# Patient Record
Sex: FEMALE | Race: WHITE
[De-identification: names, ages, dates, MRNs, and addresses within clinical notes are randomized per-mention and may not be internally consistent; named-entity substitution may affect disease eponyms.]

---

## 2024-03-13 LAB — HBA1C MFR BLD HPLC: 5.9 %

## 2024-04-16 ENCOUNTER — HOSPITAL ENCOUNTER (OUTPATIENT)
Dept: HOSPITAL 99 - HWRAD | Age: 69
End: 2024-04-16
Payer: COMMERCIAL

## 2024-04-16 DIAGNOSIS — R22.9: Primary | ICD-10-CM

## 2024-05-01 ENCOUNTER — EVALUATION (OUTPATIENT)
Dept: PHYSICAL THERAPY | Facility: CLINIC | Age: 69
End: 2024-05-01
Payer: COMMERCIAL

## 2024-05-01 DIAGNOSIS — S92.325D CLOSED NONDISPLACED FRACTURE OF SECOND METATARSAL BONE OF LEFT FOOT WITH ROUTINE HEALING, SUBSEQUENT ENCOUNTER: Primary | ICD-10-CM

## 2024-05-01 PROCEDURE — 97161 PT EVAL LOW COMPLEX 20 MIN: CPT | Performed by: PHYSICAL THERAPIST

## 2024-05-01 PROCEDURE — 97110 THERAPEUTIC EXERCISES: CPT | Performed by: PHYSICAL THERAPIST

## 2024-05-01 NOTE — LETTER
May 1, 2024    Seth Lopes PA-C  [address] [facility] [address]    Patient: Adrienne Beebe   YOB: 1955   Date of Visit: 2024     Encounter Diagnosis     ICD-10-CM    1. Closed nondisplaced fracture of second metatarsal bone of left foot with routine healing, subsequent encounter  S92.325D           Dear Dr. Lopes:    Thank you for your recent referral of Adrienne Beebe. Please review the attached evaluation summary from Adrienne's recent visit.     Please verify that you agree with the plan of care by signing the attached order.     If you have any questions or concerns, please do not hesitate to call.     I sincerely appreciate the opportunity to share in the care of one of your patients and hope to have another opportunity to work with you in the near future.       Sincerely,    Anil Ray, PT      Referring Provider:      I certify that I have read the below Plan of Care and certify the need for these services furnished under this plan of treatment while under my care.                    Seth Lopes PA-C  [address] [facility] [address]  Via Fax: [phone]          PT Evaluation     Today's date: 2024  Patient name: Adrienne Beebe  : 1955  MRN: [id number]  Referring provider: Seth Lopes PA-C  Dx:   Encounter Diagnosis     ICD-10-CM    1. Closed nondisplaced fracture of second metatarsal bone of left foot with routine healing, subsequent encounter  S92.325D                      Assessment  Assessment details: Alin is a 68 year old female 6 months after non-discplaced fracture of her 2nd metatarsal who is recovering well.  She reports only mild pain with high level and extended functional mobility tasks.  She presents with some extremely mild ankle stiffness, some decreased ankle strength, some decreased balance and motor control.  These impairments are contributing to her mild functional mobility restrictions  "and contributing to her greatest concern of her 2nd toe being slightly extended.    Impairments: abnormal coordination, abnormal or restricted ROM, lacks appropriate home exercise program and pain with function    Symptom irritability: low  Goals  Short Term Goals (2 weeks)  Pt will demonstrate at least MCID improvement in FOTO.   Pt will present with 50% improvement in ankle strength    Long Term Goals (8-12 weeks)  Pt will independently manage symptoms successfully.  Pt will present with 5/5 strength throughout the LE to decrease difficulty with all functional mobility tasks.  Pt will exceed expected outcome in FOTO.  Pt will be independent with comprehensive HEP.      Plan  Planned therapy interventions: joint mobilization, muscle pump exercises, neuromuscular re-education, therapeutic activities, strengthening, therapeutic exercise, graded exercise and home exercise program  Frequency: 1x week  Duration in visits: 4  Duration in weeks: 8  Treatment plan discussed with: patient    Subjective Evaluation    History of Present Illness  Mechanism of injury: , fall while putting her sandal on.  Falling off 2 slate steps flat onto her chest, foot plantarflexed, slaping the edge of the step.  Driven to hospital, diagnosis of closed non-displaced fracture of L 2nd metatarsal.  4 weeks in walking boot.  Transitioned to a surgical shoe for \"a few weeks.\" Mid-January was able to walk on the beach without any shoes.  Her primary concern currently is \"my toe wings up\" (2nd toe extension).  She reports fluctuating levels of pain that \"moves around.\" Being active and on her feet often tends to aggravate the foot.  She reports the pain \"moves around\" her foot and ankle.  Her greatest concern is the \"toe winging up.\"   Patient Goals  Patient goals for therapy: decreased pain  Patient goal: \"get the foot straight\"  Pain  Current pain ratin  At worst pain ratin  Location: Around the whole left " foot        Posture/Observation:  Standing   WNL    Palpation:   Mild pain end of 2nd met     A/PROM:   Left   Right  Ankle DF   WNL   WNL   Ankle PF   WNL   WNL   Ankle inversion  WNL   WNL   Ankle eversion  WNL   WNL    Strength:   Left   Right    Ankle DF   5/5   5/5   Ankle PF   4/5   4/5   Ankle inversion  4/5   4/5   Ankle eversion  4/5   4/5       Special Tests:    NT    Function:   Single Leg Stance - 10-15 seconds L / 8-12 seconds R  Ambulation - WNL            Precautions: None      Manuals 5/1            Toe flexion PROM                                                    Neuro Re-Ed             SLS 30 sec             SLS Leg Swings 10x ea.                                                                             Ther Ex             Toe Flexion PROM 10 sec            Toe flexion Stretch (standing or sitting) 10x10 sec                         Isometric Toe Flexion 10x, 3 sec             Toe Flexion with Band 10x, 3 sec                                                   Ther Activity                                       Gait Training                                       Modalities

## 2024-05-01 NOTE — PROGRESS NOTES
PT Evaluation     Today's date: 2024  Patient name: Adrienne Beebe  : 1955  MRN: [id number]  Referring provider: Seth Lopes PA-C  Dx:   Encounter Diagnosis     ICD-10-CM    1. Closed nondisplaced fracture of second metatarsal bone of left foot with routine healing, subsequent encounter  S92.325D                      Assessment  Assessment details: Alin is a 68 year old female 6 months after non-discplaced fracture of her 2nd metatarsal who is recovering well.  She reports only mild pain with high level and extended functional mobility tasks.  She presents with some extremely mild ankle stiffness, some decreased ankle strength, some decreased balance and motor control.  These impairments are contributing to her mild functional mobility restrictions and contributing to her greatest concern of her 2nd toe being slightly extended.    Impairments: abnormal coordination, abnormal or restricted ROM, lacks appropriate home exercise program and pain with function    Symptom irritability: low  Goals  Short Term Goals (2 weeks)  Pt will demonstrate at least MCID improvement in FOTO.   Pt will present with 50% improvement in ankle strength    Long Term Goals (8-12 weeks)  Pt will independently manage symptoms successfully.  Pt will present with 5/5 strength throughout the LE to decrease difficulty with all functional mobility tasks.  Pt will exceed expected outcome in FOTO.  Pt will be independent with comprehensive HEP.      Plan  Planned therapy interventions: joint mobilization, muscle pump exercises, neuromuscular re-education, therapeutic activities, strengthening, therapeutic exercise, graded exercise and home exercise program  Frequency: 1x week  Duration in visits: 4  Duration in weeks: 8  Treatment plan discussed with: patient    Subjective Evaluation    History of Present Illness  Mechanism of injury: , fall while putting her sandal on.  Falling off 2 slate steps flat onto her  "chest, foot plantarflexed, slaping the edge of the step.  Driven to hospital, diagnosis of closed non-displaced fracture of L 2nd metatarsal.  4 weeks in walking boot.  Transitioned to a surgical shoe for \"a few weeks.\" Mid-January was able to walk on the beach without any shoes.  Her primary concern currently is \"my toe wings up\" (2nd toe extension).  She reports fluctuating levels of pain that \"moves around.\" Being active and on her feet often tends to aggravate the foot.  She reports the pain \"moves around\" her foot and ankle.  Her greatest concern is the \"toe winging up.\"   Patient Goals  Patient goals for therapy: decreased pain  Patient goal: \"get the foot straight\"  Pain  Current pain ratin  At worst pain ratin  Location: Around the whole left foot        Posture/Observation:  Standing   WNL    Palpation:   Mild pain end of 2nd met     A/PROM:   Left   Right  Ankle DF   WNL   WNL   Ankle PF   WNL   WNL   Ankle inversion  WNL   WNL   Ankle eversion  WNL   WNL    Strength:   Left   Right    Ankle DF   5/5   5/5   Ankle PF   4/5   4/5   Ankle inversion  4/5   4/5   Ankle eversion  4/5   4/5       Special Tests:    NT    Function:   Single Leg Stance - 10-15 seconds L / 8-12 seconds R  Ambulation - WNL            Precautions: None      Manuals 5/1            Toe flexion PROM                                                    Neuro Re-Ed             SLS 30 sec             SLS Leg Swings 10x ea.                                                                             Ther Ex             Toe Flexion PROM 10 sec            Toe flexion Stretch (standing or sitting) 10x10 sec                         Isometric Toe Flexion 10x, 3 sec             Toe Flexion with Band 10x, 3 sec                                                   Ther Activity                                       Gait Training                                       Modalities                                            "

## 2024-05-24 ENCOUNTER — OFFICE VISIT (OUTPATIENT)
Dept: PHYSICAL THERAPY | Facility: CLINIC | Age: 69
End: 2024-05-24
Payer: COMMERCIAL

## 2024-05-24 DIAGNOSIS — S92.325D CLOSED NONDISPLACED FRACTURE OF SECOND METATARSAL BONE OF LEFT FOOT WITH ROUTINE HEALING, SUBSEQUENT ENCOUNTER: Primary | ICD-10-CM

## 2024-05-24 PROCEDURE — 97110 THERAPEUTIC EXERCISES: CPT | Performed by: PHYSICAL THERAPIST

## 2024-05-24 NOTE — PROGRESS NOTES
Daily Note     Today's date: 2024  Patient name: Adrienne Beebe  : 1955  MRN: [id number]  Referring provider: Seth Lopes PA-C  Dx:   Encounter Diagnosis     ICD-10-CM    1. Closed nondisplaced fracture of second metatarsal bone of left foot with routine healing, subsequent encounter  S92.325D                      Subjective: Adrienne feels she is making some progress with the position of her toe.       Objective: See treatment diary below      Assessment: Tolerated treatment well. Is happy with current exercise program and doesn't feel the exercises need to be progressed at this time.       Plan: Continue per plan of care.      Precautions: None      Manuals            Toe flexion PROM                                                    Neuro Re-Ed             SLS 30 sec  30 sec            SLS Leg Swings 10x ea. 10x ea.                                                                            Ther Ex             Toe Flexion PROM 10 sec 10 sec           Toe flexion Stretch (standing or sitting) 10x10 sec 10x10 sec                        Isometric Toe Flexion 10x, 3 sec  10x 3 sec           Toe Flexion with Band (just 2nd) 10x, 3 sec 10x 3 sec                                                  Ther Activity                                       Gait Training                                       Modalities

## 2024-06-13 ENCOUNTER — EVALUATION (OUTPATIENT)
Dept: PHYSICAL THERAPY | Facility: CLINIC | Age: 69
End: 2024-06-13
Payer: COMMERCIAL

## 2024-06-13 DIAGNOSIS — S92.325D CLOSED NONDISPLACED FRACTURE OF SECOND METATARSAL BONE OF LEFT FOOT WITH ROUTINE HEALING, SUBSEQUENT ENCOUNTER: Primary | ICD-10-CM

## 2024-06-13 PROCEDURE — 97112 NEUROMUSCULAR REEDUCATION: CPT | Performed by: PHYSICAL THERAPIST

## 2024-06-13 PROCEDURE — 97110 THERAPEUTIC EXERCISES: CPT | Performed by: PHYSICAL THERAPIST

## 2024-06-13 NOTE — PROGRESS NOTES
Discharge    Today's date: 2024  Patient name: Adrienne Beebe  : 1955  MRN: [id number]  Referring provider: Seth Lopes PA-C  Dx:   Encounter Diagnosis     ICD-10-CM    1. Closed nondisplaced fracture of second metatarsal bone of left foot with routine healing, subsequent encounter  S92.325D                        Assessment  Impairments: abnormal coordination, abnormal or restricted ROM, lacks appropriate home exercise program and pain with function  Symptom irritability: low    Assessment details: Progress :  Adrienne is making obvious and consistent improvement with foot pain, activity tolerance with current exercise program.  She has demonstrated obvious improvements in strength, balance and motor control which has helped to facilitate this.  Secondary to independence with currently prescribed program, she is appropriate for discharge today.     Alin is a 68 year old female 6 months after non-discplaced fracture of her 2nd metatarsal who is recovering well.  She reports only mild pain with high level and extended functional mobility tasks.  She presents with some extremely mild ankle stiffness, some decreased ankle strength, some decreased balance and motor control.  These impairments are contributing to her mild functional mobility restrictions and contributing to her greatest concern of her 2nd toe being slightly extended.      Goals  Short Term Goals (2 weeks)  Pt will demonstrate at least MCID improvement in FOTO. MET  Pt will present with 50% improvement in ankle strengthMET    Long Term Goals (8-12 weeks)  Pt will independently manage symptoms successfully.MET  Pt will present with 5/5 strength throughout the LE to decrease difficulty with all functional mobility tasks.MET  Pt will exceed expected outcome in FOTO.MET  Pt will be independent with comprehensive HEP.      Plan    Planned therapy interventions: joint mobilization, muscle pump exercises, neuromuscular  "re-education, therapeutic activities, strengthening, therapeutic exercise, graded exercise and home exercise program    Frequency: 1x week  Duration in weeks: 8  Treatment plan discussed with: patient    Subjective Evaluation    History of Present Illness  Mechanism of injury: , fall while putting her sandal on.  Falling off 2 slate steps flat onto her chest, foot plantarflexed, slaping the edge of the step.  Driven to hospital, diagnosis of closed non-displaced fracture of L 2nd metatarsal.  4 weeks in walking boot.  Transitioned to a surgical shoe for \"a few weeks.\" Mid-January was able to walk on the beach without any shoes.  Her primary concern currently is \"my toe wings up\" (2nd toe extension).  She reports fluctuating levels of pain that \"moves around.\" Being active and on her feet often tends to aggravate the foot.  She reports the pain \"moves around\" her foot and ankle.  Her greatest concern is the \"toe winging up.\"   Patient Goals  Patient goals for therapy: decreased pain  Patient goal: \"get the foot straight\"  Pain  Current pain ratin  At worst pain ratin  Location: Around the whole left foot        Posture/Observation:  Standing   WNL    Palpation:   Mild pain end of 2nd met -> Improved, eliminated pain    A/PROM:   Left   Right  Ankle DF   WNL   WNL   Ankle PF   WNL   WNL   Ankle inversion  WNL   WNL   Ankle eversion  WNL   WNL    Strength:   Left   Right    Ankle DF   5/5   5/5   Ankle PF   5/5   5/5   Ankle inversion  4/5   4/5   Ankle eversion  4/5   4/5       Special Tests:    NT    Function:   Single Leg Stance - 30+ seconds L / 30+ seconds R  Ambulation - WNL            Precautions: None        Manuals                  Toe flexion PROM                                                                                               Neuro Re-Ed                       SLS 30 sec  30 sec   Review                 SLS Leg Swings 10x ea. 10x ea.  Review                        "                                                                                                                  Ther Ex                       Toe Flexion PROM 10 sec 10 sec  Review                 Toe flexion Stretch (standing or sitting) 10x10 sec 10x10 sec  Review                                         Isometric Toe Flexion 10x, 3 sec  10x 3 sec  Review                 Toe Flexion with Band (just 2nd) 10x, 3 sec 10x 3 sec  Review                                                                                         Ther Activity                                                                       Gait Training                                                                       Modalities

## 2024-06-27 ENCOUNTER — APPOINTMENT (OUTPATIENT)
Dept: PHYSICAL THERAPY | Facility: CLINIC | Age: 69
End: 2024-06-27
Payer: COMMERCIAL

## 2024-08-22 ENCOUNTER — OFFICE VISIT (OUTPATIENT)
Dept: FAMILY MEDICINE CLINIC | Facility: CLINIC | Age: 69
End: 2024-08-22
Payer: COMMERCIAL

## 2024-08-22 VITALS
HEART RATE: 70 BPM | WEIGHT: 117 LBS | BODY MASS INDEX: 18.8 KG/M2 | TEMPERATURE: 97.4 F | OXYGEN SATURATION: 98 % | HEIGHT: 66 IN | SYSTOLIC BLOOD PRESSURE: 126 MMHG | DIASTOLIC BLOOD PRESSURE: 64 MMHG

## 2024-08-22 DIAGNOSIS — K40.90 UNILATERAL INGUINAL HERNIA WITHOUT OBSTRUCTION OR GANGRENE, RECURRENCE NOT SPECIFIED: ICD-10-CM

## 2024-08-22 DIAGNOSIS — D17.22 LIPOMA OF LEFT UPPER EXTREMITY: Primary | ICD-10-CM

## 2024-08-22 DIAGNOSIS — F41.9 ANXIETY: ICD-10-CM

## 2024-08-22 DIAGNOSIS — R11.0 NAUSEA: ICD-10-CM

## 2024-08-22 PROCEDURE — 99204 OFFICE O/P NEW MOD 45 MIN: CPT

## 2024-08-22 RX ORDER — LISINOPRIL 10 MG/1
10 TABLET ORAL DAILY
COMMUNITY

## 2024-08-22 RX ORDER — DIPHENOXYLATE HYDROCHLORIDE AND ATROPINE SULFATE 2.5; .025 MG/1; MG/1
1 TABLET ORAL DAILY
COMMUNITY

## 2024-08-22 NOTE — PROGRESS NOTES
"Ambulatory Visit  Name: Adrienne Beebe      : 1955      MRN: [id number]  Encounter Provider: Valerie Hernandez DO  Encounter Date: 2024   Encounter department: [facility]    Assessment & Plan   1. Lipoma of left upper extremity  Assessment & Plan:  -per patient, mass was ultrasounded at [facility] a few months ago and was confirmed to be a lipoma, will need to acquire records     Plan:  Referral to general surgery given today  Orders:  -     Ambulatory Referral to General Surgery; Future  2. Nausea  Assessment & Plan:  -in the morning, resolves after coffee, vaping medical marijuana    Plan:  Encourage adequate hydration   Will check CBC, CMP, TSH   Offered prn zofran, pt declines   Return for follow up, MAW visit in 2-3 months   Orders:  -     Comprehensive metabolic panel; Future  -     TSH, 3rd generation with Free T4 reflex; Future  -     CBC and Platelet; Future  -     Comprehensive metabolic panel  -     TSH, 3rd generation with Free T4 reflex  -     CBC and Platelet  3. Unilateral inguinal hernia without obstruction or gangrene, recurrence not specified  Assessment & Plan:  -present for 2 years, nontender, does not appear strangulated.    Plan:  Referral to general surgery given  Orders:  -     Ambulatory Referral to General Surgery; Future  4. Anxiety  -     TSH, 3rd generation with Free T4 reflex; Future  -     TSH, 3rd generation with Free T4 reflex       History of Present Illness     Pt presents to establish care. States she likes to \"keep it minimal\" with her healthcare and is not interested in preventative screenings like mammograms, DEXA, or colonoscopy    Has a lipoma on her left shoulder. Has had it for ~10 years but states it has grown in size and is very uncomfortable and is pulling on her neck. Had an U/S of it this spring at [facility] and was told it is a lipoma. Wants to have it removed but wants to switch from [facility] to [facility].    States she wakes up " "with nausea almost every morning. States coffee helps it go away. Usually resolves on its own in half an hour but can linger.     Also has a left inguinal hernia present for 2 years. Not painful or red.    States she has a history of anxiety for which she vapes medical marijuana, which also helps with nausea.        Review of Systems   Constitutional:  Negative for chills and fever.   HENT:  Negative for ear pain and sore throat.    Eyes:  Negative for pain and visual disturbance.   Respiratory:  Negative for cough and shortness of breath.    Cardiovascular:  Negative for chest pain and palpitations.   Gastrointestinal:  Positive for nausea. Negative for abdominal pain and vomiting.   Genitourinary:  Negative for dysuria and hematuria.   Musculoskeletal:  Negative for arthralgias and back pain.   Skin:  Negative for color change and rash.   Neurological:  Negative for seizures and syncope.   All other systems reviewed and are negative.      Objective     /64 (BP Location: Left arm, Patient Position: Sitting, Cuff Size: Adult)   Pulse 70   Temp (!) 97.4 °F (36.3 °C) (Tympanic)   Ht 5' 6\" (1.676 m)   Wt 53.1 kg (117 lb)   SpO2 98%   BMI 18.88 kg/m²     Physical Exam  Constitutional:       General: She is not in acute distress.     Appearance: Normal appearance. She is not ill-appearing or toxic-appearing.   HENT:      Head: Normocephalic and atraumatic.      Right Ear: External ear normal.      Left Ear: External ear normal.      Nose: Nose normal.   Eyes:      Conjunctiva/sclera: Conjunctivae normal.   Cardiovascular:      Rate and Rhythm: Normal rate and regular rhythm.      Heart sounds: Normal heart sounds. No murmur heard.  Pulmonary:      Effort: Pulmonary effort is normal. No respiratory distress.      Breath sounds: Normal breath sounds. No wheezing, rhonchi or rales.   Abdominal:      Hernia: A hernia is present. Hernia is present in the left inguinal area.   Musculoskeletal:         General: " Normal range of motion.   Skin:     General: Skin is warm and dry.          Neurological:      General: No focal deficit present.      Mental Status: She is alert and oriented to person, place, and time.   Psychiatric:         Mood and Affect: Mood normal.         Behavior: Behavior normal.       Administrative Statements

## 2024-08-22 NOTE — ASSESSMENT & PLAN NOTE
-present for 2 years, nontender, does not appear strangulated.    Plan:  Referral to general surgery given

## 2024-08-22 NOTE — ASSESSMENT & PLAN NOTE
-per patient, mass was ultrasounded at [place] a few months ago and was confirmed to be a lipoma, will need to acquire records     Plan:  Referral to general surgery given today

## 2024-08-22 NOTE — ASSESSMENT & PLAN NOTE
-in the morning, resolves after coffee, vaping medical marijuana    Plan:  Encourage adequate hydration   Will check CBC, CMP, TSH   Offered prn zofran, pt declines   Return for follow up, MAW visit in 2-3 months

## 2024-09-11 ENCOUNTER — CONSULT (OUTPATIENT)
Dept: SURGERY | Facility: CLINIC | Age: 69
End: 2024-09-11
Payer: COMMERCIAL

## 2024-09-11 VITALS
DIASTOLIC BLOOD PRESSURE: 88 MMHG | BODY MASS INDEX: 19.13 KG/M2 | WEIGHT: 119 LBS | RESPIRATION RATE: 16 BRPM | OXYGEN SATURATION: 99 % | TEMPERATURE: 97.3 F | HEART RATE: 91 BPM | HEIGHT: 66 IN | SYSTOLIC BLOOD PRESSURE: 138 MMHG

## 2024-09-11 DIAGNOSIS — E46 PROTEIN CALORIE MALNUTRITION (HCC): Primary | ICD-10-CM

## 2024-09-11 DIAGNOSIS — D17.22 LIPOMA OF LEFT UPPER EXTREMITY: ICD-10-CM

## 2024-09-11 DIAGNOSIS — K40.90 UNILATERAL INGUINAL HERNIA WITHOUT OBSTRUCTION OR GANGRENE, RECURRENCE NOT SPECIFIED: ICD-10-CM

## 2024-09-11 PROCEDURE — 99202 OFFICE O/P NEW SF 15 MIN: CPT | Performed by: SURGERY

## 2024-09-11 RX ORDER — SODIUM CHLORIDE, SODIUM LACTATE, POTASSIUM CHLORIDE, CALCIUM CHLORIDE 600; 310; 30; 20 MG/100ML; MG/100ML; MG/100ML; MG/100ML
125 INJECTION, SOLUTION INTRAVENOUS CONTINUOUS
OUTPATIENT
Start: 2024-10-07

## 2024-09-11 NOTE — H&P (VIEW-ONLY)
Ambulatory Visit  Name: Adrienne Beebe      : 1955      MRN: [id number]  Encounter Provider: Ayo Luna MD  Encounter Date: 2024   Encounter department: [facility]    Assessment & Plan  Unilateral inguinal hernia without obstruction or gangrene, recurrence not specified  She states that she believes she has a hernia but she is not interested in repair with mesh.  I explained that I fixed all hernias with mesh and I do not perform an open tension type hernia repair.  I did explain that in women's the recommendation is a laparoscopic approach because the high risk of a femoral hernia as well as a contralateral inguinal hernia.  She is not interested in pursuing this at this time.  Orders:    Ambulatory Referral to General Surgery    Lipoma of left upper extremity  She has a 70s mass on her left upper shoulder.  I reviewed her ultrasound from [place] is about 3 cm.  This may be slightly larger now.  Will plan for excision with sedation at [facility].  The risks benefits alters explained to her she is agreeable to proceed.  Orders:    Ambulatory Referral to General Surgery    Case request operating room: EXCISION BIOPSY TISSUE LESION/MASS LEFT SHOULDER; Standing    Case request operating room: EXCISION BIOPSY TISSUE LESION/MASS LEFT SHOULDER    Protein calorie malnutrition (HCC)  Malnutrition Findings:                                 BMI Findings:           Body mass index is 19.21 kg/m².              History of Present Illness     Adrienne Beebe is a 68 y.o. female who presents for evaluation of primarily a subcutaneous mass to her left upper shoulder.  She also has an inguinal hernia.  The lump on her shoulder been present for several years but she feels is getting larger.  She is some discomfort on her left shoulder from the lump at times.  History obtained from : patient  Review of Systems   Constitutional:  Negative for appetite change,  chills and fever.   HENT:  Negative for congestion and ear pain.    Eyes:  Negative for discharge and itching.   Respiratory:  Negative for chest tightness and shortness of breath.    Cardiovascular:  Negative for chest pain and palpitations.   Gastrointestinal:  Negative for abdominal distention and abdominal pain.   Skin:  Negative for color change and rash.   Neurological:  Negative for dizziness and numbness.   Psychiatric/Behavioral:  Negative for agitation and confusion.      Past Medical History   Past Medical History:   Diagnosis Date    Hypertension      Past Surgical History:   Procedure Laterality Date    TUBAL LIGATION      WISDOM TOOTH EXTRACTION       Family History   Problem Relation Age of Onset    Stroke Mother     Heart disease Father      Current Outpatient Medications on File Prior to Visit   Medication Sig Dispense Refill    lisinopril (ZESTRIL) 10 mg tablet Take 10 mg by mouth daily      multivitamin (THERAGRAN) TABS Take 1 tablet by mouth daily      Omega-3 Fatty Acids (FISH OIL PO) Take by mouth       No current facility-administered medications on file prior to visit.     Allergies   Allergen Reactions    Sulfa Antibiotics Rash      Current Outpatient Medications on File Prior to Visit   Medication Sig Dispense Refill    lisinopril (ZESTRIL) 10 mg tablet Take 10 mg by mouth daily      multivitamin (THERAGRAN) TABS Take 1 tablet by mouth daily      Omega-3 Fatty Acids (FISH OIL PO) Take by mouth       No current facility-administered medications on file prior to visit.      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   Constitutional:       General: She is not in acute distress.     Appearance: She is well-developed. She is not diaphoretic.   HENT:      Head: Normocephalic and atraumatic.   Eyes:      Pupils: Pupils are equal, round, and reactive to light.   Cardiovascular:      Rate and Rhythm: Normal rate and regular rhythm.   Pulmonary:      Effort: Pulmonary effort is normal. No respiratory distress.   Abdominal:      Palpations: Abdomen is soft.   Musculoskeletal:         General: Normal range of motion.      Cervical back: Normal range of motion and neck supple.   Skin:     General: Skin is warm and dry.      Comments: Left shoulder supraclavicular area there is a about 3 to 4 cm smooth mobile subcutaneous mass sitting on top of the trapezius.   Neurological:      Mental Status: She is alert and oriented to person, place, and time.   Psychiatric:         Behavior: Behavior normal.         "

## 2024-09-11 NOTE — PROGRESS NOTES
Ambulatory Visit  Name: Adrienne Beebe      : 1955      MRN: [id number]  Encounter Provider: Ayo Luna MD  Encounter Date: 2024   Encounter department: [facility]    Assessment & Plan  Unilateral inguinal hernia without obstruction or gangrene, recurrence not specified  She states that she believes she has a hernia but she is not interested in repair with mesh.  I explained that I fixed all hernias with mesh and I do not perform an open tension type hernia repair.  I did explain that in women's the recommendation is a laparoscopic approach because the high risk of a femoral hernia as well as a contralateral inguinal hernia.  She is not interested in pursuing this at this time.  Orders:    Ambulatory Referral to General Surgery    Lipoma of left upper extremity  She has a 70s mass on her left upper shoulder.  I reviewed her ultrasound from [place] is about 3 cm.  This may be slightly larger now.  Will plan for excision with sedation at [facility].  The risks benefits alters explained to her she is agreeable to proceed.  Orders:    Ambulatory Referral to General Surgery    Case request operating room: EXCISION BIOPSY TISSUE LESION/MASS LEFT SHOULDER; Standing    Case request operating room: EXCISION BIOPSY TISSUE LESION/MASS LEFT SHOULDER    Protein calorie malnutrition (HCC)  Malnutrition Findings:                                 BMI Findings:           Body mass index is 19.21 kg/m².              History of Present Illness     Adrienne Beebe is a 68 y.o. female who presents for evaluation of primarily a subcutaneous mass to her left upper shoulder.  She also has an inguinal hernia.  The lump on her shoulder been present for several years but she feels is getting larger.  She is some discomfort on her left shoulder from the lump at times.  History obtained from : patient  Review of Systems   Constitutional:  Negative for appetite change,  chills and fever.   HENT:  Negative for congestion and ear pain.    Eyes:  Negative for discharge and itching.   Respiratory:  Negative for chest tightness and shortness of breath.    Cardiovascular:  Negative for chest pain and palpitations.   Gastrointestinal:  Negative for abdominal distention and abdominal pain.   Skin:  Negative for color change and rash.   Neurological:  Negative for dizziness and numbness.   Psychiatric/Behavioral:  Negative for agitation and confusion.      Past Medical History   Past Medical History:   Diagnosis Date    Hypertension      Past Surgical History:   Procedure Laterality Date    TUBAL LIGATION      WISDOM TOOTH EXTRACTION       Family History   Problem Relation Age of Onset    Stroke Mother     Heart disease Father      Current Outpatient Medications on File Prior to Visit   Medication Sig Dispense Refill    lisinopril (ZESTRIL) 10 mg tablet Take 10 mg by mouth daily      multivitamin (THERAGRAN) TABS Take 1 tablet by mouth daily      Omega-3 Fatty Acids (FISH OIL PO) Take by mouth       No current facility-administered medications on file prior to visit.     Allergies   Allergen Reactions    Sulfa Antibiotics Rash      Current Outpatient Medications on File Prior to Visit   Medication Sig Dispense Refill    lisinopril (ZESTRIL) 10 mg tablet Take 10 mg by mouth daily      multivitamin (THERAGRAN) TABS Take 1 tablet by mouth daily      Omega-3 Fatty Acids (FISH OIL PO) Take by mouth       No current facility-administered medications on file prior to visit.      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   Constitutional:       General: She is not in acute distress.     Appearance: She is well-developed. She is not diaphoretic.   HENT:      Head: Normocephalic and atraumatic.   Eyes:      Pupils: Pupils are equal, round, and reactive to light.   Cardiovascular:      Rate and Rhythm: Normal rate and regular rhythm.   Pulmonary:      Effort: Pulmonary effort is normal. No respiratory distress.   Abdominal:      Palpations: Abdomen is soft.   Musculoskeletal:         General: Normal range of motion.      Cervical back: Normal range of motion and neck supple.   Skin:     General: Skin is warm and dry.      Comments: Left shoulder supraclavicular area there is a about 3 to 4 cm smooth mobile subcutaneous mass sitting on top of the trapezius.   Neurological:      Mental Status: She is alert and oriented to person, place, and time.   Psychiatric:         Behavior: Behavior normal.         "

## 2024-09-11 NOTE — ASSESSMENT & PLAN NOTE
She states that she believes she has a hernia but she is not interested in repair with mesh.  I explained that I fixed all hernias with mesh and I do not perform an open tension type hernia repair.  I did explain that in women's the recommendation is a laparoscopic approach because the high risk of a femoral hernia as well as a contralateral inguinal hernia.  She is not interested in pursuing this at this time.  Orders:    Ambulatory Referral to General Surgery

## 2024-09-11 NOTE — ASSESSMENT & PLAN NOTE
She has a 70s mass on her left upper shoulder.  I reviewed her ultrasound from [place] is about 3 cm.  This may be slightly larger now.  Will plan for excision with sedation at [facility]  The risks benefits alters explained to her she is agreeable to proceed.  Orders:    Ambulatory Referral to General Surgery    Case request operating room: EXCISION BIOPSY TISSUE LESION/MASS LEFT SHOULDER; Standing    Case request operating room: EXCISION BIOPSY TISSUE LESION/MASS LEFT SHOULDER

## 2024-09-11 NOTE — ASSESSMENT & PLAN NOTE
Malnutrition Findings:                                 BMI Findings:           Body mass index is 19.21 kg/m².

## 2024-09-26 LAB
ALBUMIN SERPL-MCNC: 4.5 G/DL (ref 3.9–4.9)
ALP SERPL-CCNC: 77 IU/L (ref 44–121)
ALT SERPL-CCNC: 23 IU/L (ref 0–32)
AST SERPL-CCNC: 34 IU/L (ref 0–40)
BILIRUB SERPL-MCNC: 0.3 MG/DL (ref 0–1.2)
BUN SERPL-MCNC: 19 MG/DL (ref 8–27)
BUN/CREAT SERPL: 23 (ref 12–28)
CALCIUM SERPL-MCNC: 9.9 MG/DL (ref 8.7–10.3)
CHLORIDE SERPL-SCNC: 102 MMOL/L (ref 96–106)
CO2 SERPL-SCNC: 23 MMOL/L (ref 20–29)
CREAT SERPL-MCNC: 0.84 MG/DL (ref 0.57–1)
EGFR: 75 ML/MIN/1.73
ERYTHROCYTE [DISTWIDTH] IN BLOOD BY AUTOMATED COUNT: 12 % (ref 11.7–15.4)
GLOBULIN SER-MCNC: 2.6 G/DL (ref 1.5–4.5)
GLUCOSE SERPL-MCNC: 108 MG/DL (ref 70–99)
HCT VFR BLD AUTO: 44.4 % (ref 34–46.6)
HGB BLD-MCNC: 14.7 G/DL (ref 11.1–15.9)
MCH RBC QN AUTO: 30.7 PG (ref 26.6–33)
MCHC RBC AUTO-ENTMCNC: 33.1 G/DL (ref 31.5–35.7)
MCV RBC AUTO: 93 FL (ref 79–97)
PLATELET # BLD AUTO: 274 X10E3/UL (ref 150–450)
POTASSIUM SERPL-SCNC: 4.8 MMOL/L (ref 3.5–5.2)
PROT SERPL-MCNC: 7.1 G/DL (ref 6–8.5)
RBC # BLD AUTO: 4.79 X10E6/UL (ref 3.77–5.28)
SODIUM SERPL-SCNC: 140 MMOL/L (ref 134–144)
TSH SERPL DL<=0.005 MIU/L-ACNC: 1.44 UIU/ML (ref 0.45–4.5)
WBC # BLD AUTO: 7.8 X10E3/UL (ref 3.4–10.8)

## 2024-10-03 ENCOUNTER — ANESTHESIA EVENT (OUTPATIENT)
Dept: PERIOP | Facility: HOSPITAL | Age: 69
End: 2024-10-03
Payer: COMMERCIAL

## 2024-10-04 NOTE — PRE-PROCEDURE INSTRUCTIONS
Pre-Surgery Instructions:   Medication Instructions    lisinopril (ZESTRIL) 10 mg tablet Hold day of surgery.    MAGNESIUM PO Stop taking 3 days prior to surgery.    multivitamin (THERAGRAN) TABS Stop taking 3 days prior to surgery.    Omega-3 Fatty Acids (FISH OIL PO) Stopped 9/30   Medication instructions for day surgery reviewed. Please use only a sip of water to take your instructed medications. Avoid all over the counter vitamins, supplements and NSAIDS for one week prior to surgery per anesthesia guidelines. Tylenol is ok to take as needed.     You will receive a call one business day prior to surgery with an arrival time and hospital directions. If your surgery is scheduled on a Monday, the hospital will be calling you on the Friday prior to your surgery. If you have not heard from anyone by 8pm, please call the hospital supervisor through the hospital  at [phone]. ([place] [phone] or [place] [phone]).    Do not eat or drink anything after midnight the night before your surgery, including candy, mints, lifesavers, or chewing gum. Do not drink alcohol 24hrs before your surgery. Try not to smoke at least 24hrs before your surgery.       Follow the pre surgery showering instructions as listed in the “My Surgical Experience Booklet” or otherwise provided by your surgeon's office. Do not use a blade to shave the surgical area 1 week before surgery. It is okay to use a clean electric clippers up to 24 hours before surgery. Do not apply any lotions, creams, including makeup, cologne, deodorant, or perfumes after showering on the day of your surgery. Do not use dry shampoo, hair spray, hair gel, or any type of hair products.     No contact lenses, eye make-up, or artificial eyelashes. Remove nail polish, including gel polish, and any artificial, gel, or acrylic nails if possible. Remove all jewelry including rings and body piercing jewelry.     Wear causal clothing that is easy to take on and  off. Consider your type of surgery.    Keep any valuables, jewelry, piercings at home. Please bring any specially ordered equipment (sling, braces) if indicated.    Arrange for a responsible person to drive you to and from the hospital on the day of your surgery. Please confirm the visitor policy for the day of your procedure when you receive your phone call with an arrival time.     Call the surgeon's office with any new illnesses, exposures, or additional questions prior to surgery.    Please reference your “My Surgical Experience Booklet” for additional information to prepare for your upcoming surgery.

## 2024-10-07 ENCOUNTER — ANESTHESIA (OUTPATIENT)
Dept: PERIOP | Facility: HOSPITAL | Age: 69
End: 2024-10-07
Payer: COMMERCIAL

## 2024-10-07 ENCOUNTER — HOSPITAL ENCOUNTER (OUTPATIENT)
Facility: HOSPITAL | Age: 69
Setting detail: OUTPATIENT SURGERY
Discharge: HOME | End: 2024-10-07
Attending: SURGERY | Admitting: SURGERY
Payer: COMMERCIAL

## 2024-10-07 VITALS
TEMPERATURE: 98.2 F | WEIGHT: 116.84 LBS | DIASTOLIC BLOOD PRESSURE: 103 MMHG | HEIGHT: 66 IN | RESPIRATION RATE: 12 BRPM | BODY MASS INDEX: 18.78 KG/M2 | SYSTOLIC BLOOD PRESSURE: 181 MMHG | HEART RATE: 68 BPM | OXYGEN SATURATION: 98 %

## 2024-10-07 DIAGNOSIS — D17.22 LIPOMA OF LEFT UPPER EXTREMITY: ICD-10-CM

## 2024-10-07 PROBLEM — E78.5 HYPERLIPIDEMIA: Status: ACTIVE | Noted: 2024-10-07

## 2024-10-07 PROBLEM — I10 HTN (HYPERTENSION): Status: ACTIVE | Noted: 2024-10-07

## 2024-10-07 PROCEDURE — 12032 INTMD RPR S/A/T/EXT 2.6-7.5: CPT | Performed by: SURGERY

## 2024-10-07 PROCEDURE — 88304 TISSUE EXAM BY PATHOLOGIST: CPT | Performed by: SPECIALIST

## 2024-10-07 PROCEDURE — 11406 EXC TR-EXT B9+MARG >4.0 CM: CPT | Performed by: SURGERY

## 2024-10-07 RX ORDER — ONDANSETRON 2 MG/ML
INJECTION INTRAMUSCULAR; INTRAVENOUS AS NEEDED
Status: DISCONTINUED | OUTPATIENT
Start: 2024-10-07 | End: 2024-10-07

## 2024-10-07 RX ORDER — LIDOCAINE HYDROCHLORIDE AND EPINEPHRINE 10; 10 MG/ML; UG/ML
INJECTION, SOLUTION INFILTRATION; PERINEURAL AS NEEDED
Status: DISCONTINUED | OUTPATIENT
Start: 2024-10-07 | End: 2024-10-07 | Stop reason: HOSPADM

## 2024-10-07 RX ORDER — PROPOFOL 10 MG/ML
INJECTION, EMULSION INTRAVENOUS AS NEEDED
Status: DISCONTINUED | OUTPATIENT
Start: 2024-10-07 | End: 2024-10-07

## 2024-10-07 RX ORDER — HYDROCODONE BITARTRATE AND ACETAMINOPHEN 5; 325 MG/1; MG/1
1 TABLET ORAL EVERY 4 HOURS PRN
Status: DISCONTINUED | OUTPATIENT
Start: 2024-10-07 | End: 2024-10-07 | Stop reason: HOSPADM

## 2024-10-07 RX ORDER — SODIUM CHLORIDE, SODIUM LACTATE, POTASSIUM CHLORIDE, CALCIUM CHLORIDE 600; 310; 30; 20 MG/100ML; MG/100ML; MG/100ML; MG/100ML
125 INJECTION, SOLUTION INTRAVENOUS CONTINUOUS
Status: DISCONTINUED | OUTPATIENT
Start: 2024-10-07 | End: 2024-10-07 | Stop reason: HOSPADM

## 2024-10-07 RX ORDER — DEXAMETHASONE SODIUM PHOSPHATE 10 MG/ML
INJECTION, SOLUTION INTRAMUSCULAR; INTRAVENOUS AS NEEDED
Status: DISCONTINUED | OUTPATIENT
Start: 2024-10-07 | End: 2024-10-07

## 2024-10-07 RX ORDER — SODIUM CHLORIDE, SODIUM LACTATE, POTASSIUM CHLORIDE, CALCIUM CHLORIDE 600; 310; 30; 20 MG/100ML; MG/100ML; MG/100ML; MG/100ML
125 INJECTION, SOLUTION INTRAVENOUS CONTINUOUS
Status: DISCONTINUED | OUTPATIENT
Start: 2024-10-07 | End: 2024-10-07 | Stop reason: SDUPTHER

## 2024-10-07 RX ORDER — ONDANSETRON 2 MG/ML
4 INJECTION INTRAMUSCULAR; INTRAVENOUS ONCE AS NEEDED
Status: DISCONTINUED | OUTPATIENT
Start: 2024-10-07 | End: 2024-10-07 | Stop reason: HOSPADM

## 2024-10-07 RX ORDER — CEFAZOLIN SODIUM 1 G/50ML
1000 SOLUTION INTRAVENOUS ONCE
Status: COMPLETED | OUTPATIENT
Start: 2024-10-07 | End: 2024-10-07

## 2024-10-07 RX ORDER — HYDROCODONE BITARTRATE AND ACETAMINOPHEN 5; 325 MG/1; MG/1
1 TABLET ORAL EVERY 6 HOURS PRN
Qty: 6 TABLET | Refills: 0 | Status: SHIPPED | OUTPATIENT
Start: 2024-10-07 | End: 2024-10-17

## 2024-10-07 RX ORDER — FENTANYL CITRATE 50 UG/ML
INJECTION, SOLUTION INTRAMUSCULAR; INTRAVENOUS AS NEEDED
Status: DISCONTINUED | OUTPATIENT
Start: 2024-10-07 | End: 2024-10-07

## 2024-10-07 RX ORDER — LIDOCAINE HYDROCHLORIDE 20 MG/ML
INJECTION, SOLUTION EPIDURAL; INFILTRATION; INTRACAUDAL; PERINEURAL AS NEEDED
Status: DISCONTINUED | OUTPATIENT
Start: 2024-10-07 | End: 2024-10-07

## 2024-10-07 RX ORDER — HYDROMORPHONE HCL/PF 1 MG/ML
0.5 SYRINGE (ML) INJECTION
Status: DISCONTINUED | OUTPATIENT
Start: 2024-10-07 | End: 2024-10-07 | Stop reason: HOSPADM

## 2024-10-07 RX ORDER — FENTANYL CITRATE/PF 50 MCG/ML
25 SYRINGE (ML) INJECTION
Status: DISCONTINUED | OUTPATIENT
Start: 2024-10-07 | End: 2024-10-07 | Stop reason: HOSPADM

## 2024-10-07 RX ORDER — BUPIVACAINE HYDROCHLORIDE 5 MG/ML
INJECTION, SOLUTION EPIDURAL; INTRACAUDAL AS NEEDED
Status: DISCONTINUED | OUTPATIENT
Start: 2024-10-07 | End: 2024-10-07 | Stop reason: HOSPADM

## 2024-10-07 RX ORDER — MIDAZOLAM HYDROCHLORIDE 2 MG/2ML
INJECTION, SOLUTION INTRAMUSCULAR; INTRAVENOUS AS NEEDED
Status: DISCONTINUED | OUTPATIENT
Start: 2024-10-07 | End: 2024-10-07

## 2024-10-07 RX ADMIN — FENTANYL CITRATE 50 MCG: 50 INJECTION INTRAMUSCULAR; INTRAVENOUS at 09:34

## 2024-10-07 RX ADMIN — SODIUM CHLORIDE, SODIUM LACTATE, POTASSIUM CHLORIDE, AND CALCIUM CHLORIDE: .6; .31; .03; .02 INJECTION, SOLUTION INTRAVENOUS at 09:47

## 2024-10-07 RX ADMIN — PROPOFOL 150 MG: 10 INJECTION, EMULSION INTRAVENOUS at 09:30

## 2024-10-07 RX ADMIN — ONDANSETRON 4 MG: 2 INJECTION INTRAMUSCULAR; INTRAVENOUS at 09:40

## 2024-10-07 RX ADMIN — DEXAMETHASONE SODIUM PHOSPHATE 8 MG: 10 INJECTION INTRAMUSCULAR; INTRAVENOUS at 09:40

## 2024-10-07 RX ADMIN — CEFAZOLIN SODIUM 1000 MG: 1 SOLUTION INTRAVENOUS at 09:20

## 2024-10-07 RX ADMIN — FENTANYL CITRATE 50 MCG: 50 INJECTION INTRAMUSCULAR; INTRAVENOUS at 10:05

## 2024-10-07 RX ADMIN — LIDOCAINE HYDROCHLORIDE 60 MG: 20 INJECTION, SOLUTION EPIDURAL; INFILTRATION; INTRACAUDAL at 09:30

## 2024-10-07 RX ADMIN — MIDAZOLAM 2 MG: 1 INJECTION INTRAMUSCULAR; INTRAVENOUS at 09:23

## 2024-10-07 RX ADMIN — SODIUM CHLORIDE, SODIUM LACTATE, POTASSIUM CHLORIDE, AND CALCIUM CHLORIDE 125 ML/HR: .6; .31; .03; .02 INJECTION, SOLUTION INTRAVENOUS at 08:13

## 2024-10-07 NOTE — ANESTHESIA PREPROCEDURE EVALUATION
Procedure:  EXCISION BX TISSUE LESION/MASS LEFT SHOULDER (Left: Shoulder)    Relevant Problems   ANESTHESIA (within normal limits)      CARDIO   (+) HTN (hypertension)      ENDO (within normal limits)      GI/HEPATIC (within normal limits)      /RENAL (within normal limits)      HEMATOLOGY (within normal limits)      MUSCULOSKELETAL (within normal limits)      NEURO/PSYCH (within normal limits)  THC daily       PULMONARY (within normal limits)        Physical Exam    Airway    Mallampati score: I         Dental       Cardiovascular  Cardiovascular exam normal    Pulmonary  Pulmonary exam normal     Other Findings  post-pubertal.      Anesthesia Plan  ASA Score- 2     Anesthesia Type- general with ASA Monitors.         Additional Monitors:     Airway Plan:            Plan Factors-Exercise tolerance (METS): >4 METS.    Chart reviewed.   Existing labs reviewed. Patient summary reviewed.                  Induction- intravenous.    Postoperative Plan- Plan for postoperative opioid use.         Informed Consent- Anesthetic plan and risks discussed with patient.  I personally reviewed this patient with the CRNA. Discussed and agreed on the Anesthesia Plan with the CRNA..

## 2024-10-07 NOTE — INTERVAL H&P NOTE
H&P reviewed. After examining the patient I find no changes in the patients condition since the H&P had been written.    Vitals:    10/07/24 0802   BP: 160/83   Pulse: 72   Resp: 16   Temp: 98.4 °F (36.9 °C)   SpO2: 92%

## 2024-10-07 NOTE — DISCHARGE INSTR - AVS FIRST PAGE
[facility]     Post-Operative Care Instructions     Dr. Ayo Luna MD, [facility]     [phone]          1. General: You will feel pulling sensations around the wound or funny aches and pains around the incisions. This is normal. Even minor surgery is a change in your body and this is your body’s way of reacting to it. If you have had abdominal surgery, it may help to support the incision with a small pillow or blanket for comfort when moving or coughing.     2. Wound care:  Okay to shower.  The glue will fall off over the next week or 2.   Use ice for at least the 1st 48 hours.  Do not use for longer than 20 minutes at a time. Use 3 times per day.     3. Water: You may shower over the wounds. Do not bathe or use a pool or hot tub until cleared by the physician.   If you were discharged with a drain, make sure drain site is covered with plastic wrap before showering.      4. Activity: You may go up and down stairs, walk as much as you are comfortable, but walk at least 3 times each day. If you have had abdominal surgery, do not lift anything heavier than 20 pounds for at least 4 weeks.      5. Diet: You may resume a regular diet. If you had a same-day surgery or overnight stay surgery, you may wish to eat lightly for a few days: soups, crackers, and sandwiches. You may resume a regular diet when ready.      6. Medications: Resume all of your previous medications, unless told otherwise by the doctor. Avoid aspirin products for 2-3 days after the date of surgery. You may, at that time, began to take them again. Use Tylenol and Ibuprofen for pain control.  You may alternate these medications every 3 hours.  For example: you may take Tylenol at noon, Ibuprofen at 3:00 p.m., and Tylenol again at 6:00 p.m., etc. You should use ice to assist with pain control as above.  You do not need to take narcotic pain medication unless you are having significant pain.   If you were prescribed a  narcotic pain medication containing Tylenol, such as Percocet or Norco, do not use supplemental Tylenol.      7. Driving: You will need someone to drive you home on the day of surgery or discharge. Do not drive or make any important decisions while on narcotic pain medication or 24 hours and after anesthesia or sedation for surgery. Generally, you may drive when your off all narcotic pain medications and you are comfortable.      8. Upset Stomach: You may take Maalox, Tums, or similar items for an upset stomach. If your narcotic pain medication causes an upset stomach, do not take it on an empty stomach. Try taking it with at least some crackers or toast.      9. Constipation: Patients often experience constipation after surgery. You may take over-the-counter medication for this, such as Metamucil, Senokot, Dulcolax, milk of magnesia, etc. You may take a suppository unless you have had anorectal surgery such as a procedure on your hemorrhoids. If you experience significant nausea or vomiting after abdominal surgery, call the office before trying any of these medications.     10. Call the office: If you are experiencing any of the following: fevers above 101.5°, significant nausea or vomiting, if the wound develops drainage and/or there is excessive redness around the wound, or if you have significant diarrhea or other worsening symptoms.     11. Pain: You may be given a prescription for pain medication.  This will be sent to your pharmacy prior to discharge.     12. Sexual Activity: You may resume sexual activity when you feel ready and comfortable and your incision is sealed and healed without apparent infection risk.     13. Urination: If you have not urinated in 6 hours, go directly to the ER for evaluation for urinary retention.      14. Follow-up in 2 weeks.          **READ ONLY IF YOU HAVE BEEN DISCHARGED WITH A URINARY CATHETER**    Quesada Insertion for Post-Op Urinary Retention        - A prescription for  Flomax will be sent to your pharmacy.  This should be taken daily while the urinary catheter remains in place.    You will not be given a prescription for Flomax if your prostate has been removed.  If you are already taking Flomax, continue the medication as prescribed.     - We will send a message to the urology group who will contact you within the next 48 hours with further instructions and to schedule an appointment for voiding trial and catheter removal.  The urinary catheter will remain in place for approximately 1 week.  If you are not contacted within the next 48 hours please call our office to assist with scheduling your follow-up.     - If you have your own urologist, you should contact your physician the day after discharge for instructions and to schedule a voiding trial and catheter removal.

## 2024-10-07 NOTE — ANESTHESIA POSTPROCEDURE EVALUATION
Post-Op Assessment Note    CV Status:  Stable    Pain management: adequate       Mental Status:  Alert and awake   Hydration Status:  Euvolemic   PONV Controlled:  Controlled   Airway Patency:  Patent     Post Op Vitals Reviewed: Yes    No anethesia notable event occurred.    Staff: CRNA               /77 (10/07/24 1026)    Temp 97.9 °F (36.6 °C) (10/07/24 1026)    Pulse 67 (10/07/24 1026)   Resp   14   SpO2   100%

## 2024-10-07 NOTE — OP NOTE
OPERATIVE REPORT  PATIENT NAME: Adrienne Beebe    :  1955  MRN: [id number]  Pt Location: [facility] OR ROOM 03    SURGERY DATE: 10/7/2024    Surgeons and Role:     * Ayo Luna MD - Primary     * Tod Arce MD - Assisting    Preop Diagnosis:  Lipoma of left upper extremity [D17.22]    Post-Op Diagnosis Codes:     * Lipoma of left upper extremity [D17.22]    Procedure(s):  Left - EXCISION BX TISSUE LESION/MASS LEFT SHOULDER    Specimen(s):  ID Type Source Tests Collected by Time Destination   1 : left shoulder mass Tissue Arm, Left TISSUE EXAM Ayo Luna MD 10/7/2024 0956        Estimated Blood Loss:   Minimal    Drains:  * No LDAs found *    Anesthesia Type:   Choice    Operative Indications:  Lipoma of left upper extremity [D17.22]      Operative Findings:  Subcutaneous mass of the left shoulder measuring 5.5 cm x 2.5 cm.      Complications:   None    Procedure and Technique:  The patient was seen again in the Holding Room. The risks, benefits, complications, treatment options, and expected outcomes were discussed with the patient.  The patient and/or family concurred with the proposed plan, giving informed consent. The site of surgery properly noted/marked. The patient was taken to Operating Room, identified as Adrienne Beebe  and the procedure verified. A Time Out was held after prepping and draping in sterile fashion.  The above information was confirmed.    A mixture of 1% lidocaine with epinephrine and 0.5% Marcaine was used for local anesthesia.  An incision was made with a 15 blade scalpel.  The subcu tissue dissected with Bovie cautery down to the subtendinous mass.  The mass identified and freed up circumferentially by combination of blunt dissection and Bovie cautery.  The mass measured 5.5 x 2.5 cm.  He was sent to pathology.  The wound was inspected there was excellent hemostasis.  The skin was closed in layers with interrupted 3-0 Vicryl suture followed by a running 4-0 Monocryl  subcuticular suture.  Followed by glue.       I was present for the entire procedure.    Patient Disposition:  PACU              SIGNATURE: Ayo Luna MD  DATE: October 7, 2024  TIME: 10:03 AM

## 2024-10-11 PROCEDURE — 88304 TISSUE EXAM BY PATHOLOGIST: CPT | Performed by: SPECIALIST

## 2024-10-23 ENCOUNTER — OFFICE VISIT (OUTPATIENT)
Dept: SURGERY | Facility: CLINIC | Age: 69
End: 2024-10-23

## 2024-10-23 VITALS
HEART RATE: 59 BPM | SYSTOLIC BLOOD PRESSURE: 142 MMHG | RESPIRATION RATE: 16 BRPM | BODY MASS INDEX: 19.13 KG/M2 | TEMPERATURE: 96.8 F | DIASTOLIC BLOOD PRESSURE: 80 MMHG | HEIGHT: 66 IN | OXYGEN SATURATION: 95 % | WEIGHT: 119 LBS

## 2024-10-23 DIAGNOSIS — K40.90 UNILATERAL INGUINAL HERNIA WITHOUT OBSTRUCTION OR GANGRENE: Primary | ICD-10-CM

## 2024-10-23 DIAGNOSIS — D17.22 LIPOMA OF LEFT UPPER EXTREMITY: ICD-10-CM

## 2024-10-23 PROCEDURE — 99024 POSTOP FOLLOW-UP VISIT: CPT | Performed by: SURGERY

## 2024-10-23 NOTE — ASSESSMENT & PLAN NOTE
Still recommend repair of her inguinal hernia.  She still not ready for surgery and is still very concerned about mesh.  She knows to follow-up as needed.  Also reviewed concerning signs of incarceration.

## 2024-10-23 NOTE — ASSESSMENT & PLAN NOTE
Overall she is very well.  The incision is well-healed.  Pathology showed lipoma.  Follow-up as needed

## 2024-10-23 NOTE — PROGRESS NOTES
Ambulatory Visit  Name: Adrienne Beebe      : 1955      MRN: [id number]  Encounter Provider: Ayo Luna MD  Encounter Date: 10/23/2024   Encounter department: [facility]    Assessment & Plan  Unilateral inguinal hernia without obstruction or gangrene  Still recommend repair of her inguinal hernia.  She still not ready for surgery and is still very concerned about mesh.  She knows to follow-up as needed.  Also reviewed concerning signs of incarceration.         Lipoma of left upper extremity  Overall she is very well.  The incision is well-healed.  Pathology showed lipoma.  Follow-up as needed           History of Present Illness     Adrienne Beebe is a 69 y.o. female who presents for evaluation of primarily a subcutaneous mass to her left upper shoulder.  She also has an inguinal hernia.  The lump on her shoulder been present for several years but she feels is getting larger.  She is some discomfort on her left shoulder from the lump at times.    10/23/2024 she got 2 weeks status post excision of a left shoulder mass.  No complaints.  Doing very well.    History obtained from : patient  Review of Systems  Past Medical History   Past Medical History:   Diagnosis Date    Hypertension      Past Surgical History:   Procedure Laterality Date    CT EXC TUMOR SOFT TISS UPPER ARM/ELBW SUBFASC 5CM/> Left 10/7/2024    Procedure: EXCISION BX TISSUE LESION/MASS LEFT SHOULDER;  Surgeon: Ayo Luna MD;  Location: [facility];  Service: General    TUBAL LIGATION      WISDOM TOOTH EXTRACTION       Family History   Problem Relation Age of Onset    Stroke Mother     Heart disease Father      Current Outpatient Medications on File Prior to Visit   Medication Sig Dispense Refill    lisinopril (ZESTRIL) 10 mg tablet Take 10 mg by mouth daily      MAGNESIUM PO Take by mouth      multivitamin (THERAGRAN) TABS Take 1 tablet by mouth daily      Omega-3 Fatty Acids (FISH OIL PO) Take by  "mouth in the morning       No current facility-administered medications on file prior to visit.     Allergies   Allergen Reactions    Sulfa Antibiotics Rash      Current Outpatient Medications on File Prior to Visit   Medication Sig Dispense Refill    lisinopril (ZESTRIL) 10 mg tablet Take 10 mg by mouth daily      MAGNESIUM PO Take by mouth      multivitamin (THERAGRAN) TABS Take 1 tablet by mouth daily      Omega-3 Fatty Acids (FISH OIL PO) Take by mouth in the morning       No current facility-administered medications on file prior to visit.      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         "

## 2025-01-23 ENCOUNTER — OFFICE VISIT (OUTPATIENT)
Dept: FAMILY MEDICINE CLINIC | Facility: CLINIC | Age: 70
End: 2025-01-23
Payer: COMMERCIAL

## 2025-01-23 VITALS
DIASTOLIC BLOOD PRESSURE: 58 MMHG | TEMPERATURE: 97.2 F | BODY MASS INDEX: 19.46 KG/M2 | SYSTOLIC BLOOD PRESSURE: 110 MMHG | WEIGHT: 124 LBS | HEIGHT: 67 IN | OXYGEN SATURATION: 99 % | HEART RATE: 68 BPM

## 2025-01-23 DIAGNOSIS — I10 PRIMARY HYPERTENSION: Primary | ICD-10-CM

## 2025-01-23 PROCEDURE — 99213 OFFICE O/P EST LOW 20 MIN: CPT

## 2025-01-23 RX ORDER — LISINOPRIL 10 MG/1
10 TABLET ORAL DAILY
Qty: 90 TABLET | Refills: 1 | Status: SHIPPED | OUTPATIENT
Start: 2025-01-23

## 2025-01-23 RX ORDER — VITAMIN B COMPLEX
1 CAPSULE ORAL DAILY
COMMUNITY

## 2025-01-23 NOTE — PROGRESS NOTES
Name: Adrienne Beebe      : 1955      MRN: [id number]  Encounter Provider: Valerie Hernandez DO  Encounter Date: 2025   Encounter department: [facility]  :  Assessment & Plan  Primary hypertension  -controlled today, BP is 110/58 in office  -target given age, comorbidities <150/90  -current regimen,: lisinopril 10mg QD     Plan:  Discussed increasing lisinopril to 20mg QD, but given BP at target today will hold off. Patient will checl BP at home daily for next month and record readings. Will bring readings and home BP cuff to follow up visit/MAW visit in 1 month. If readings consistently above target at that time can consider increasing lisinopril to 20mg QD     Orders:    lisinopril (ZESTRIL) 10 mg tablet; Take 1 tablet (10 mg total) by mouth daily           History of Present Illness   Patient presents for follow up visit. Would like to discuss blood pressure medication. Currently taking lisinopril 10mg QD and has been on it for 5 years. States she checks her blood pressure at home and is concerned because sometimes it goes into 150-160's/90's, especially after activity. She has not kept a log. Denies any chest pain, shortness of breath, headache.     Pt also reports her nausea resolved after she stopped smoking medical marijuana.    Had lipoma removed, is doing well.     Hypertension  Pertinent negatives include no chest pain, palpitations or shortness of breath.     Review of Systems   Constitutional:  Negative for chills and fever.   HENT:  Negative for ear pain and sore throat.    Eyes:  Negative for pain and visual disturbance.   Respiratory:  Negative for cough and shortness of breath.    Cardiovascular:  Negative for chest pain and palpitations.   Gastrointestinal:  Negative for abdominal pain and vomiting.   Genitourinary:  Negative for dysuria and hematuria.   Musculoskeletal:  Negative for arthralgias and back pain.   Skin:  Negative for color change and rash.  "  Neurological:  Negative for seizures and syncope.   All other systems reviewed and are negative.      Objective   /58 (BP Location: Left arm, Patient Position: Sitting, Cuff Size: Adult)   Pulse 68   Temp (!) 97.2 °F (36.2 °C) (Tympanic)   Ht 5' 6.5\" (1.689 m)   Wt 56.2 kg (124 lb)   SpO2 99%   BMI 19.71 kg/m²      Physical Exam  Constitutional:       General: She is not in acute distress.     Appearance: Normal appearance. She is not ill-appearing or toxic-appearing.   HENT:      Head: Normocephalic and atraumatic.      Right Ear: External ear normal.      Left Ear: External ear normal.      Nose: Nose normal.   Eyes:      Conjunctiva/sclera: Conjunctivae normal.   Cardiovascular:      Rate and Rhythm: Normal rate and regular rhythm.      Heart sounds: Normal heart sounds. No murmur heard.  Pulmonary:      Effort: Pulmonary effort is normal. No respiratory distress.      Breath sounds: Normal breath sounds. No wheezing, rhonchi or rales.   Musculoskeletal:         General: Normal range of motion.   Skin:     General: Skin is warm and dry.   Neurological:      General: No focal deficit present.      Mental Status: She is alert and oriented to person, place, and time.   Psychiatric:         Mood and Affect: Mood normal.         Behavior: Behavior normal.         "

## 2025-01-23 NOTE — ASSESSMENT & PLAN NOTE
-controlled today, BP is 110/58 in office  -target given age, comorbidities <150/90  -current regimen,: lisinopril 10mg QD     Plan:  Discussed increasing lisinopril to 20mg QD, but given BP at target today will hold off. Patient will checl BP at home daily for next month and record readings. Will bring readings and home BP cuff to follow up visit/MAW visit in 1 month. If readings consistently above target at that time can consider increasing lisinopril to 20mg QD     Orders:    lisinopril (ZESTRIL) 10 mg tablet; Take 1 tablet (10 mg total) by mouth daily

## 2025-02-12 ENCOUNTER — OFFICE VISIT (OUTPATIENT)
Dept: FAMILY MEDICINE CLINIC | Facility: CLINIC | Age: 70
End: 2025-02-12
Payer: COMMERCIAL

## 2025-02-12 VITALS
OXYGEN SATURATION: 98 % | HEIGHT: 66 IN | HEART RATE: 74 BPM | DIASTOLIC BLOOD PRESSURE: 70 MMHG | WEIGHT: 120 LBS | TEMPERATURE: 97.3 F | BODY MASS INDEX: 19.29 KG/M2 | SYSTOLIC BLOOD PRESSURE: 122 MMHG

## 2025-02-12 DIAGNOSIS — Z00.00 MEDICARE ANNUAL WELLNESS VISIT, SUBSEQUENT: Primary | ICD-10-CM

## 2025-02-12 DIAGNOSIS — Z86.39 HISTORY OF HYPERLIPIDEMIA: ICD-10-CM

## 2025-02-12 DIAGNOSIS — R79.9 ABNORMAL FINDING OF BLOOD CHEMISTRY, UNSPECIFIED: ICD-10-CM

## 2025-02-12 DIAGNOSIS — R73.01 ELEVATED FASTING BLOOD SUGAR: ICD-10-CM

## 2025-02-12 DIAGNOSIS — I10 PRIMARY HYPERTENSION: ICD-10-CM

## 2025-02-12 PROCEDURE — G0439 PPPS, SUBSEQ VISIT: HCPCS

## 2025-02-12 NOTE — PROGRESS NOTES
Name: Adrienne Beebe      : 1955      MRN: [id number]  Encounter Provider: Valerie Hernandez DO  Encounter Date: 2025   Encounter department: [facility]    Assessment & Plan  Medicare annual wellness visit, subsequent  -breast ca screening: DUE for mammogram, declines   -colorectal ca screening: DUE, declines   -osteoporosis : DUE for DEXA, declines   -will order A1c, lipid panel        Primary hypertension  -controlled, BP is 122/70 today  -current regimen: lisinopril 10mg QD   -home BP readings run 130-160/  -target BP given age, comorbidities <140/90    Plan:  Discussed increasing lisinopril to 20mg QD, pt declines   Instructed patient to check blood pressure at home at least once a day for 2 weeks. If top number (systolic blood pressure) is above 140 for more than half the readings, or the bottom number (diastolic blood pressure) is above 90 for more than half the readings, please call our office for follow up  Discussed lifestyle modifications to lower blood pressure        History of hyperlipidemia    Orders:    Lipid panel; Future    Hemoglobin A1C; Future    Elevated fasting blood sugar    Orders:    Hemoglobin A1C; Future    Abnormal finding of blood chemistry, unspecified    Orders:    Lipid panel; Future       Preventive health issues were discussed with patient, and age appropriate screening tests were ordered as noted in patient's After Visit Summary. Personalized health advice and appropriate referrals for health education or preventive services given if needed, as noted in patient's After Visit Summary.    History of Present Illness     Patient presents for MAW visit. States she is overall doing well, nausea is much improved. Explains she does not believe in preventative screenings for breast and colorectal cancer and declines them at this time. Also brings BP log with her, readings range from 120-160/.        Patient Care Team:  Valerie Hernandez DO as PCP  - General (Family Medicine)    Review of Systems   Constitutional:  Negative for chills and fever.   HENT:  Negative for ear pain and sore throat.    Eyes:  Negative for pain and visual disturbance.   Respiratory:  Negative for cough and shortness of breath.    Cardiovascular:  Negative for chest pain and palpitations.   Gastrointestinal:  Negative for abdominal pain and vomiting.   Genitourinary:  Negative for dysuria and hematuria.   Musculoskeletal:  Negative for arthralgias and back pain.   Skin:  Negative for color change and rash.   Neurological:  Negative for seizures and syncope.   All other systems reviewed and are negative.    Medical History Reviewed by provider this encounter:       Annual Wellness Visit Questionnaire   Adrienne is here for her Subsequent Wellness visit. Last Medicare Wellness visit information reviewed, patient interviewed and updates made to the record today.      Health Risk Assessment:   Patient rates overall health as very good. Patient feels that their physical health rating is slightly better. Patient is satisfied with their life. Eyesight was rated as same. Hearing was rated as same. Patient feels that their emotional and mental health rating is same. Patients states they are often angry. Patient states they are often unusually tired/fatigued. Pain experienced in the last 7 days has been none. Patient states that she has experienced no weight loss or gain in last 6 months.     Depression Screening:   PHQ-2 Score: 2      Fall Risk Screening:   In the past year, patient has experienced: no history of falling in past year      Urinary Incontinence Screening:   Patient has not leaked urine accidently in the last six months.     Home Safety:  Patient does not have trouble with stairs inside or outside of their home. Patient has no working smoke alarms and has no working carbon monoxide detector. Home safety hazards include: none.     Nutrition:   Current diet is Regular.      Medications:   Patient is currently taking over-the-counter supplements. OTC medications include: see medication list. Patient is able to manage medications.     Activities of Daily Living (ADLs)/Instrumental Activities of Daily Living (IADLs):   Walk and transfer into and out of bed and chair?: Yes  Dress and groom yourself?: Yes    Bathe or shower yourself?: Yes    Feed yourself? Yes  Do your laundry/housekeeping?: Yes  Manage your money, pay your bills and track your expenses?: Yes  Make your own meals?: Yes    Do your own shopping?: Yes    Previous Hospitalizations:   Any hospitalizations or ED visits within the last 12 months?: No      PREVENTIVE SCREENINGS        Diabetes Screening:     General: Screening Current      Cervical Cancer Screening:    General: Screening Not Indicated      Lung Cancer Screening:     General: Screening Not Indicated    Screening, Brief Intervention, and Referral to Treatment (SBIRT)     Screening  Typical number of drinks in a day: 0  Typical number of drinks in a week: 0  Interpretation: Low risk drinking behavior.    Single Item Drug Screening:  How often have you used an illegal drug (including marijuana) or a prescription medication for non-medical reasons in the past year? patient refused    Single Item Drug Screen Score: 0  Interpretation: Negative screen for possible drug use disorder    Social Drivers of Health     Food Insecurity: No Food Insecurity (2/12/2025)    Hunger Vital Sign     Worried About Running Out of Food in the Last Year: Never true     Ran Out of Food in the Last Year: Never true   Transportation Needs: No Transportation Needs (2/12/2025)    PRAPARE - Transportation     Lack of Transportation (Medical): No     Lack of Transportation (Non-Medical): No   Housing Stability: Low Risk  (2/12/2025)    Housing Stability Vital Sign     Unable to Pay for Housing in the Last Year: No     Number of Times Moved in the Last Year: 0     Homeless in the Last Year: No  "  Utilities: Not At Risk (2/12/2025)    [facility] Utilities     Threatened with loss of utilities: No     No results found.    Objective   /70   Pulse 74   Temp (!) 97.3 °F (36.3 °C) (Tympanic)   Ht 5' 6\" (1.676 m)   Wt 54.4 kg (120 lb)   SpO2 98%   BMI 19.37 kg/m²     Physical Exam  Constitutional:       General: She is not in acute distress.     Appearance: Normal appearance. She is not ill-appearing or toxic-appearing.   HENT:      Head: Normocephalic and atraumatic.      Right Ear: Tympanic membrane, ear canal and external ear normal.      Left Ear: Tympanic membrane, ear canal and external ear normal.      Nose: Nose normal.      Mouth/Throat:      Mouth: Mucous membranes are moist.      Pharynx: Oropharynx is clear. No oropharyngeal exudate or posterior oropharyngeal erythema.   Eyes:      Extraocular Movements: Extraocular movements intact.      Conjunctiva/sclera: Conjunctivae normal.      Pupils: Pupils are equal, round, and reactive to light.   Cardiovascular:      Rate and Rhythm: Normal rate and regular rhythm.      Heart sounds: Normal heart sounds. No murmur heard.  Pulmonary:      Effort: Pulmonary effort is normal. No respiratory distress.      Breath sounds: Normal breath sounds. No wheezing, rhonchi or rales.   Abdominal:      General: Bowel sounds are normal. There is no distension.      Palpations: Abdomen is soft.      Tenderness: There is no abdominal tenderness. There is no guarding or rebound.   Musculoskeletal:         General: Normal range of motion.      Cervical back: Normal range of motion. No rigidity or tenderness.   Lymphadenopathy:      Cervical: No cervical adenopathy.   Skin:     General: Skin is warm and dry.   Neurological:      General: No focal deficit present.      Mental Status: She is alert and oriented to person, place, and time.   Psychiatric:         Mood and Affect: Mood normal.         Behavior: Behavior normal.         "

## 2025-02-12 NOTE — PATIENT INSTRUCTIONS
Medicare Preventive Visit Patient Instructions  Thank you for completing your Welcome to Medicare Visit or Medicare Annual Wellness Visit today. Your next wellness visit will be due in one year (2/13/2026).  The screening/preventive services that you may require over the next 5-10 years are detailed below. Some tests may not apply to you based off risk factors and/or age. Screening tests ordered at today's visit but not completed yet may show as past due. Also, please note that scanned in results may not display below.  Preventive Screenings:  Service Recommendations Previous Testing/Comments   Colorectal Cancer Screening  * Colonoscopy    * Fecal Occult Blood Test (FOBT)/Fecal Immunochemical Test (FIT)  * Fecal DNA/Cologuard Test  * Flexible Sigmoidoscopy Age: 45-75 years old   Colonoscopy: every 10 years (may be performed more frequently if at higher risk)  OR  FOBT/FIT: every 1 year  OR  Cologuard: every 3 years  OR  Sigmoidoscopy: every 5 years  Screening may be recommended earlier than age 45 if at higher risk for colorectal cancer. Also, an individualized decision between you and your healthcare provider will decide whether screening between the ages of 76-85 would be appropriate. Colonoscopy: Not on file  FOBT/FIT: Not on file  Cologuard: Not on file  Sigmoidoscopy: Not on file          Breast Cancer Screening Age: 40+ years old  Frequency: every 1-2 years  Not required if history of left and right mastectomy Mammogram: Not on file        Cervical Cancer Screening Between the ages of 21-29, pap smear recommended once every 3 years.   Between the ages of 30-65, can perform pap smear with HPV co-testing every 5 years.   Recommendations may differ for women with a history of total hysterectomy, cervical cancer, or abnormal pap smears in past. Pap Smear: Not on file        Hepatitis C Screening Once for adults born between 1945 and 1965  More frequently in patients at high risk for Hepatitis C Hep C Antibody: Not  on file        Diabetes Screening 1-2 times per year if you're at risk for diabetes or have pre-diabetes Fasting glucose: No results in last 5 years (No results in last 5 years)  A1C: 5.9 (3/13/2024)      Cholesterol Screening Once every 5 years if you don't have a lipid disorder. May order more often based on risk factors. Lipid panel: Not on file          Other Preventive Screenings Covered by Medicare:  Abdominal Aortic Aneurysm (AAA) Screening: covered once if your at risk. You're considered to be at risk if you have a family history of AAA.  Lung Cancer Screening: covers low dose CT scan once per year if you meet all of the following conditions: (1) Age 55-77; (2) No signs or symptoms of lung cancer; (3) Current smoker or have quit smoking within the last 15 years; (4) You have a tobacco smoking history of at least 20 pack years (packs per day multiplied by number of years you smoked); (5) You get a written order from a healthcare provider.  Glaucoma Screening: covered annually if you're considered high risk: (1) You have diabetes OR (2) Family history of glaucoma OR (3)  aged 50 and older OR (4)  American aged 65 and older  Osteoporosis Screening: covered every 2 years if you meet one of the following conditions: (1) You're estrogen deficient and at risk for osteoporosis based off medical history and other findings; (2) Have a vertebral abnormality; (3) On glucocorticoid therapy for more than 3 months; (4) Have primary hyperparathyroidism; (5) On osteoporosis medications and need to assess response to drug therapy.   Last bone density test (DXA Scan): Not on file.  HIV Screening: covered annually if you're between the age of 15-65. Also covered annually if you are younger than 15 and older than 65 with risk factors for HIV infection. For pregnant patients, it is covered up to 3 times per pregnancy.    Immunizations:  Immunization Recommendations   Influenza Vaccine Annual influenza  vaccination during flu season is recommended for all persons aged >= 6 months who do not have contraindications   Pneumococcal Vaccine   * Pneumococcal conjugate vaccine = PCV13 (Prevnar 13), PCV15 (Vaxneuvance), PCV20 (Prevnar 20)  * Pneumococcal polysaccharide vaccine = PPSV23 (Pneumovax) Adults 19-63 yo with certain risk factors or if 65+ yo  If never received any pneumonia vaccine: recommend Prevnar 20 (PCV20)  Give PCV20 if previously received 1 dose of PCV13 or PPSV23   Hepatitis B Vaccine 3 dose series if at intermediate or high risk (ex: diabetes, end stage renal disease, liver disease)   Respiratory syncytial virus (RSV) Vaccine - COVERED BY MEDICARE PART D  * RSVPreF3 (Arexvy) CDC recommends that adults 60 years of age and older may receive a single dose of RSV vaccine using shared clinical decision-making (SCDM)   Tetanus (Td) Vaccine - COST NOT COVERED BY MEDICARE PART B Following completion of primary series, a booster dose should be given every 10 years to maintain immunity against tetanus. Td may also be given as tetanus wound prophylaxis.   Tdap Vaccine - COST NOT COVERED BY MEDICARE PART B Recommended at least once for all adults. For pregnant patients, recommended with each pregnancy.   Shingles Vaccine (Shingrix) - COST NOT COVERED BY MEDICARE PART B  2 shot series recommended in those 19 years and older who have or will have weakened immune systems or those 50 years and older     Health Maintenance Due:      Topic Date Due   • Hepatitis C Screening  Never done   • Breast Cancer Screening: Mammogram  Never done   • Colorectal Cancer Screening  Never done     Immunizations Due:      Topic Date Due   • Pneumococcal Vaccine: 65+ Years (1 of 1 - PCV) Never done   • Influenza Vaccine (1) Never done   • COVID-19 Vaccine (3 - 2024-25 season) 09/01/2024     Advance Directives   What are advance directives?  Advance directives are legal documents that state your wishes and plans for medical care. These  plans are made ahead of time in case you lose your ability to make decisions for yourself. Advance directives can apply to any medical decision, such as the treatments you want, and if you want to donate organs.   What are the types of advance directives?  There are many types of advance directives, and each state has rules about how to use them. You may choose a combination of any of the following:  Living will:  This is a written record of the treatment you want. You can also choose which treatments you do not want, which to limit, and which to stop at a certain time. This includes surgery, medicine, IV fluid, and tube feedings.   Durable power of  for healthcare (DPAHC):  This is a written record that states who you want to make healthcare choices for you when you are unable to make them for yourself. This person, called a proxy, is usually a family member or a friend. You may choose more than 1 proxy.  Do not resuscitate (DNR) order:  A DNR order is used in case your heart stops beating or you stop breathing. It is a request not to have certain forms of treatment, such as CPR. A DNR order may be included in other types of advance directives.  Medical directive:  This covers the care that you want if you are in a coma, near death, or unable to make decisions for yourself. You can list the treatments you want for each condition. Treatment may include pain medicine, surgery, blood transfusions, dialysis, IV or tube feedings, and a ventilator (breathing machine).  Values history:  This document has questions about your views, beliefs, and how you feel and think about life. This information can help others choose the care that you would choose.  Why are advance directives important?  An advance directive helps you control your care. Although spoken wishes may be used, it is better to have your wishes written down. Spoken wishes can be misunderstood, or not followed. Treatments may be given even if you do not  want them. An advance directive may make it easier for your family to make difficult choices about your care.       © Copyright [employer] 2018 Information is for End User's use only and may not be sold, redistributed or otherwise used for commercial purposes. All illustrations and images included in CareNotes® are the copyrighted property of [employer] or [employer]

## 2025-02-12 NOTE — ASSESSMENT & PLAN NOTE
-controlled, BP is 122/70 today  -current regimen: lisinopril 10mg QD   -home BP readings run 130-160/  -target BP given age, comorbidities <140/90    Plan:  Discussed increasing lisinopril to 20mg QD, pt declines   Instructed patient to check blood pressure at home at least once a day for 2 weeks. If top number (systolic blood pressure) is above 140 for more than half the readings, or the bottom number (diastolic blood pressure) is above 90 for more than half the readings, please call our office for follow up  Discussed lifestyle modifications to lower blood pressure

## 2025-07-08 ENCOUNTER — NURSE TRIAGE (OUTPATIENT)
Dept: OTHER | Facility: OTHER | Age: 70
End: 2025-07-08

## 2025-07-09 ENCOUNTER — OFFICE VISIT (OUTPATIENT)
Dept: FAMILY MEDICINE CLINIC | Facility: CLINIC | Age: 70
End: 2025-07-09
Payer: COMMERCIAL

## 2025-07-09 VITALS
TEMPERATURE: 97.2 F | WEIGHT: 116 LBS | HEIGHT: 64 IN | BODY MASS INDEX: 19.81 KG/M2 | DIASTOLIC BLOOD PRESSURE: 78 MMHG | OXYGEN SATURATION: 99 % | RESPIRATION RATE: 18 BRPM | SYSTOLIC BLOOD PRESSURE: 136 MMHG | HEART RATE: 73 BPM

## 2025-07-09 DIAGNOSIS — I10 PRIMARY HYPERTENSION: ICD-10-CM

## 2025-07-09 DIAGNOSIS — Z87.891 HISTORY OF SMOKING FOR MORE THAN 10 YEARS: ICD-10-CM

## 2025-07-09 DIAGNOSIS — R10.10 UPPER ABDOMINAL PAIN: ICD-10-CM

## 2025-07-09 DIAGNOSIS — F12.20 CANNABIS DEPENDENCE (HCC): Primary | ICD-10-CM

## 2025-07-09 PROCEDURE — 99214 OFFICE O/P EST MOD 30 MIN: CPT | Performed by: NURSE PRACTITIONER

## 2025-07-09 RX ORDER — LISINOPRIL 10 MG/1
15 TABLET ORAL DAILY
Qty: 90 TABLET | Refills: 1 | Status: SHIPPED | OUTPATIENT
Start: 2025-07-09

## 2025-07-09 RX ORDER — MAGNESIUM 200 MG
1 TABLET ORAL EVERY 24 HOURS
COMMUNITY

## 2025-07-09 RX ORDER — UBIDECARENONE 100 MG
CAPSULE ORAL
COMMUNITY

## 2025-07-09 NOTE — TELEPHONE ENCOUNTER
"REASON FOR CONVERSATION: Hypertension    SYMPTOMS: 2/10 mild headache and blood pressure of 162/89 at 9:41 pm. Denies chest pain, difficulty breathing, double vision, blurry vision, weakness, numbness, and all other symptoms.     OTHER HEALTH INFORMATION: Has a history of hypertension and is on lisinopril 10 mg once daily but took 15 mg this morning.     PROTOCOL DISPOSITION: See PCP Within 3 Days    CARE ADVICE PROVIDED: Patient wanted to know if she can take another dose of her lisinopril. Sent ESC to provider who stated patient can take another 10 mg tonight of lisinopril. Provided also advised that patient cut back on salt intake, drink plenty of water, and recommended BP check in office tomorrow. Provider would like patient to bring her cuff with her to appointment. Patient to go to ER with worsening symptoms, numbness/tingling, weakness, severe headache, chest pain or difficulty breathing. Reviewed all recommendations and ER precautions with patient, verbalized understanding.    PRACTICE FOLLOW-UP: Please call patient to get her scheduled for Wednesday if possible for blood pressure check per provider's request, thank you.    Reason for Disposition   Systolic BP  >= 160 OR Diastolic >= 100    Answer Assessment - Initial Assessment Questions  1. BLOOD PRESSURE: \"What is your blood pressure?\" \"Did you take at least two measurements 5 minutes apart?\"        179/113 prior to triage call about 9:30 pm  162/89 heart rate 70 at 9:41 pm     2. ONSET: \"When did you take your blood pressure?\"        9:30 pm    3. HOW: \"How did you take your blood pressure?\" (e.g., automatic home BP monitor, visiting nurse)        Automatic home blood pressure machine. Been taking blood pressure on the left arm only.     4. HISTORY: \"Do you have a history of high blood pressure?\"        History of hypertension     5. MEDICINES: \"Are you taking any medicines for blood pressure?\" \"Have you missed any doses recently?\"        Denies missing " "doses. Took 15 mg of lisinopril this morning instead of 10 mg.    6. OTHER SYMPTOMS: \"Do you have any symptoms?\" (e.g., blurred vision, chest pain, difficulty breathing, headache, weakness)        2/10 Mild headache-across the top of the head. Denies weakness or numbness to one one side of the body. Denies all other symptoms.    Protocols used: Blood Pressure - High-Adult-AH    "

## 2025-07-09 NOTE — PROGRESS NOTES
Name: Adrienne Beebe      : 1955      MRN: [id number]  Encounter Provider: ANNA Coleman  Encounter Date: 2025   Encounter department: [facility]  :  Assessment & Plan  Primary hypertension  Increase lisinopril to 15mg daily  Decrease caffeine intake  Stop cannabis usage  Monitor BP   Orders:    lisinopril (ZESTRIL) 10 mg tablet; Take 1.5 tablets (15 mg total) by mouth daily    Cannabis dependence (HCC)  Encouraged cessation given her abdominal pain and hyperemesis       Upper abdominal pain  Schedule US RUQ  Orders:    US abdomen complete; Future    History of smoking for more than 10 years    Orders:    US abdomen complete; Future           History of Present Illness     Here today for BP check  She was getting elevated BP numbers yesterday and she took 15mg yesterday and called at night on call due to elevated blood pressures still in the 160/ after taking the higher dose of lisinopril    She took another 15mgs this morning      No headache  She does get some pressure in her upper abdomen- comes and goes on its own. She gets this sensation periodically- couple times weekly. Never happening with exertion but does happen usually with laying down. She gets 2 cups of coffee daily and tea in afternoon, no spicy foods. Doesn't belch more than normal, no sore thraot, no dry cough.     She does have medical marijuana vaping  and gives her hyperemesis             Review of Systems   Constitutional:  Negative for chills and fever.   HENT:  Negative for ear pain and sore throat.    Eyes:  Negative for pain and visual disturbance.   Respiratory:  Negative for cough and shortness of breath.    Cardiovascular:  Negative for chest pain and palpitations.   Gastrointestinal:  Positive for abdominal pain and nausea. Negative for vomiting.   Genitourinary:  Negative for dysuria and hematuria.   Musculoskeletal:  Negative for arthralgias and back pain.   Skin:  Negative for color change  "and rash.   Neurological:  Negative for seizures and syncope.   All other systems reviewed and are negative.      Objective   /80   Pulse 73   Temp (!) 97.2 °F (36.2 °C) (Tympanic)   Resp 18   Ht 5' 3.75\" (1.619 m)   Wt 52.6 kg (116 lb)   SpO2 99%   BMI 20.07 kg/m²      Physical Exam  Constitutional:       General: She is not in acute distress.     Appearance: Normal appearance. She is not ill-appearing or toxic-appearing.   HENT:      Head: Normocephalic and atraumatic.      Right Ear: Tympanic membrane, ear canal and external ear normal.      Left Ear: Tympanic membrane, ear canal and external ear normal.      Nose: Nose normal.      Mouth/Throat:      Mouth: Mucous membranes are moist.      Pharynx: Oropharynx is clear. No oropharyngeal exudate or posterior oropharyngeal erythema.     Eyes:      Extraocular Movements: Extraocular movements intact.      Conjunctiva/sclera: Conjunctivae normal.      Pupils: Pupils are equal, round, and reactive to light.       Cardiovascular:      Rate and Rhythm: Normal rate and regular rhythm.      Heart sounds: Normal heart sounds. No murmur heard.  Pulmonary:      Effort: Pulmonary effort is normal. No respiratory distress.      Breath sounds: Normal breath sounds. No wheezing, rhonchi or rales.   Abdominal:      General: Bowel sounds are normal. There is no distension.      Palpations: Abdomen is soft.      Tenderness: There is no abdominal tenderness. There is no guarding or rebound.     Musculoskeletal:         General: Normal range of motion.      Cervical back: Normal range of motion. No rigidity or tenderness.   Lymphadenopathy:      Cervical: No cervical adenopathy.     Skin:     General: Skin is warm and dry.     Neurological:      General: No focal deficit present.      Mental Status: She is alert and oriented to person, place, and time.     Psychiatric:         Mood and Affect: Mood normal.         Behavior: Behavior normal.         "

## 2025-07-09 NOTE — ASSESSMENT & PLAN NOTE
Increase lisinopril to 15mg daily  Decrease caffeine intake  Stop cannabis usage  Monitor BP   Orders:    lisinopril (ZESTRIL) 10 mg tablet; Take 1.5 tablets (15 mg total) by mouth daily

## 2025-07-18 ENCOUNTER — HOSPITAL ENCOUNTER (OUTPATIENT)
Dept: ULTRASOUND IMAGING | Facility: HOSPITAL | Age: 70
End: 2025-07-18
Attending: NURSE PRACTITIONER
Payer: COMMERCIAL

## 2025-07-18 DIAGNOSIS — Z87.891 HISTORY OF SMOKING FOR MORE THAN 10 YEARS: ICD-10-CM

## 2025-07-18 DIAGNOSIS — R10.10 UPPER ABDOMINAL PAIN: ICD-10-CM

## 2025-07-18 PROCEDURE — 76700 US EXAM ABDOM COMPLETE: CPT

## 2025-07-23 ENCOUNTER — TELEPHONE (OUTPATIENT)
Age: 70
End: 2025-07-23
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
## 2025-07-23 NOTE — TELEPHONE ENCOUNTER
Patient called stated her BP has been in the range of 140/80 they past 2 weeks since she has increased her Rx: Lisinopril form 10mg to 15mg daily.    Also requesting her US of Abdomen done on 7/18/25.    Please call radiology to have some one read it as soon as possible  Please review  Thank you

## 2025-07-24 ENCOUNTER — TELEPHONE (OUTPATIENT)
Dept: FAMILY MEDICINE CLINIC | Facility: CLINIC | Age: 70
End: 2025-07-24

## 2025-07-24 NOTE — TELEPHONE ENCOUNTER
Spoke to pt   gave message about labs from provider to Pt   Pt reported b/ps for today after starting 20mg lisinipril today   before medication 112/78   about 4 hrs after med. 92/54 @11:15, then 88/58 @noon Pt denies any dizziness and also states a manual cuff is being used (threw automatic in the trash) after speaking to provider it was recommended she go back to 15 mg and make an appt ot f/u    appt made for 8/1 10am arrival

## 2025-07-31 LAB
B BURGDOR IGG+IGM SER QL IA: NEGATIVE
CHOLEST SERPL-MCNC: 225 MG/DL (ref 100–199)
CHOLEST/HDLC SERPL: 2.4 RATIO (ref 0–4.4)
EST. AVERAGE GLUCOSE BLD GHB EST-MCNC: 117 MG/DL
HBA1C MFR BLD: 5.7 % (ref 4.8–5.6)
HDLC SERPL-MCNC: 92 MG/DL
LDLC SERPL CALC-MCNC: 120 MG/DL (ref 0–99)
SL AMB VLDL CHOLESTEROL CALC: 13 MG/DL (ref 5–40)
TRIGL SERPL-MCNC: 73 MG/DL (ref 0–149)

## 2025-08-01 ENCOUNTER — OFFICE VISIT (OUTPATIENT)
Dept: FAMILY MEDICINE CLINIC | Facility: CLINIC | Age: 70
End: 2025-08-01
Payer: COMMERCIAL

## 2025-08-01 VITALS
BODY MASS INDEX: 19.44 KG/M2 | HEIGHT: 66 IN | WEIGHT: 121 LBS | DIASTOLIC BLOOD PRESSURE: 76 MMHG | TEMPERATURE: 96.6 F | HEART RATE: 62 BPM | OXYGEN SATURATION: 98 % | SYSTOLIC BLOOD PRESSURE: 140 MMHG

## 2025-08-01 DIAGNOSIS — I10 PRIMARY HYPERTENSION: Primary | ICD-10-CM

## 2025-08-01 DIAGNOSIS — F41.9 ANXIETY: ICD-10-CM

## 2025-08-01 DIAGNOSIS — R73.03 PREDIABETES: ICD-10-CM

## 2025-08-01 PROCEDURE — 99213 OFFICE O/P EST LOW 20 MIN: CPT

## (undated) DEVICE — GAUZE SPONGES,16 PLY: Brand: CURITY

## (undated) DEVICE — SCD SEQUENTIAL COMPRESSION COMFORT SLEEVE MEDIUM KNEE LENGTH: Brand: KENDALL SCD

## (undated) DEVICE — GLOVE INDICATOR PI UNDERGLOVE SZ 8 BLUE

## (undated) DEVICE — GLOVE INDICATOR PI UNDERGLOVE SZ 6.5 BLUE

## (undated) DEVICE — 2000CC GUARDIAN II: Brand: GUARDIAN

## (undated) DEVICE — SUT ETHILON 2-0 FS 18 IN 664H

## (undated) DEVICE — BETHLEHEM UNIVERSAL MINOR GEN: Brand: CARDINAL HEALTH

## (undated) DEVICE — SUT VICRYL 2-0 SH 27 IN UNDYED J417H

## (undated) DEVICE — EXOFIN PRECISION PEN HIGH VISCOSITY TOPICAL SKIN ADHESIVE: Brand: EXOFIN PRECISION PEN, 1G

## (undated) DEVICE — ANTIBACTERIAL UNDYED BRAIDED (POLYGLACTIN 910), SYNTHETIC ABSORBABLE SUTURE: Brand: COATED VICRYL

## (undated) DEVICE — DRESSING MEPORE FILM ADHESIVE 4 X 5IN

## (undated) DEVICE — DRAPE EQUIPMENT RF WAND

## (undated) DEVICE — INTENDED FOR TISSUE SEPARATION, AND OTHER PROCEDURES THAT REQUIRE A SHARP SURGICAL BLADE TO PUNCTURE OR CUT.: Brand: BARD-PARKER SAFETY BLADES SIZE 15, STERILE

## (undated) DEVICE — GLOVE SRG BIOGEL 6.5

## (undated) DEVICE — GLOVE SRG BIOGEL ECLIPSE 7.5

## (undated) DEVICE — CHLORAPREP HI-LITE 26ML ORANGE